# Patient Record
Sex: MALE | URBAN - METROPOLITAN AREA
[De-identification: names, ages, dates, MRNs, and addresses within clinical notes are randomized per-mention and may not be internally consistent; named-entity substitution may affect disease eponyms.]

---

## 2021-11-22 ENCOUNTER — OFFICE VISIT (OUTPATIENT)
Dept: URBAN - METROPOLITAN AREA CLINIC 76 | Facility: CLINIC | Age: 56
End: 2021-11-22
Payer: COMMERCIAL

## 2021-11-22 DIAGNOSIS — H10.45 OTHER CHRONIC ALLERGIC CONJUNCTIVITIS: ICD-10-CM

## 2021-11-22 PROCEDURE — 99203 OFFICE O/P NEW LOW 30 MIN: CPT | Performed by: OPTOMETRIST

## 2021-11-22 RX ORDER — PREDNISOLONE ACETATE 10 MG/ML
1 % SUSPENSION/ DROPS OPHTHALMIC
Qty: 10 | Refills: 0 | Status: ACTIVE
Start: 2021-11-22

## 2021-11-22 ASSESSMENT — INTRAOCULAR PRESSURE
OS: 19
OD: 18

## 2021-11-22 NOTE — IMPRESSION/PLAN
Impression: Allergic dermatitis of eyelid: H01.119. Bilateral. Plan: Discussed. Start Prednisolone QID OU until itching stops then TID OU x 3 days, BID OU until f/u. Recommend cool compresses 1-2x a day.

## 2021-12-06 ENCOUNTER — OFFICE VISIT (OUTPATIENT)
Dept: URBAN - METROPOLITAN AREA CLINIC 76 | Facility: CLINIC | Age: 56
End: 2021-12-06
Payer: COMMERCIAL

## 2021-12-06 DIAGNOSIS — H01.119 ALLERGIC DERMATITIS OF EYELID: Primary | ICD-10-CM

## 2021-12-06 PROCEDURE — 99213 OFFICE O/P EST LOW 20 MIN: CPT | Performed by: OPTOMETRIST

## 2021-12-06 ASSESSMENT — INTRAOCULAR PRESSURE
OD: 16
OS: 16

## 2021-12-06 NOTE — IMPRESSION/PLAN
Impression: Allergic dermatitis of eyelid: H01.119. Bilateral. Improved OU. Plan: Discussed. Continue Prednisolone QD OD x 1 week then d/c. Recommend cool compresses 1-2x a day.